# Patient Record
Sex: FEMALE | Race: WHITE | NOT HISPANIC OR LATINO | Employment: FULL TIME | URBAN - METROPOLITAN AREA
[De-identification: names, ages, dates, MRNs, and addresses within clinical notes are randomized per-mention and may not be internally consistent; named-entity substitution may affect disease eponyms.]

---

## 2020-02-05 LAB — CRC RECOMMENDATION EXT: NORMAL

## 2021-11-04 RX ORDER — HYDROCHLOROTHIAZIDE 25 MG/1
25 TABLET ORAL
COMMUNITY
Start: 2021-01-05 | End: 2021-11-05

## 2021-11-04 RX ORDER — ESCITALOPRAM OXALATE 10 MG/1
TABLET ORAL
COMMUNITY
Start: 2020-11-02 | End: 2021-11-05 | Stop reason: SDUPTHER

## 2021-11-04 RX ORDER — METFORMIN HYDROCHLORIDE 500 MG/1
TABLET, EXTENDED RELEASE ORAL
COMMUNITY
Start: 2021-01-05 | End: 2023-05-29

## 2023-02-27 RX ORDER — SEMAGLUTIDE 1.34 MG/ML
INJECTION, SOLUTION SUBCUTANEOUS
COMMUNITY
Start: 2022-12-14 | End: 2023-02-27 | Stop reason: SDUPTHER

## 2023-02-27 RX ORDER — SEMAGLUTIDE 1.34 MG/ML
0.5 INJECTION, SOLUTION SUBCUTANEOUS
Qty: 1 PEN | Refills: 5 | Status: SHIPPED | OUTPATIENT
Start: 2023-02-27 | End: 2023-02-28

## 2023-02-27 NOTE — TELEPHONE ENCOUNTER
----- Message from Layton Alonzo sent at 2/27/2023  2:13 PM CST -----  Regarding: Refill  Pt needs Ozempic refills. Please call pt.

## 2023-02-28 RX ORDER — SEMAGLUTIDE 1.34 MG/ML
0.5 INJECTION, SOLUTION SUBCUTANEOUS
Qty: 1 PEN | Refills: 5 | Status: SHIPPED | OUTPATIENT
Start: 2023-02-28 | End: 2023-04-04 | Stop reason: SDUPTHER

## 2023-02-28 NOTE — TELEPHONE ENCOUNTER
----- Message from Alix Krueger sent at 2/28/2023  4:52 PM CST -----  Contact: Elena  Patient stated that medication was called to the wrong pharmacy Please send to the below alike company, stated she has [employer], [facility]       .OZEMPIC 0.25 mg or 0.5 mg(2 mg/1.5 mL) pen injector 1 pen 5 2/27/2023   Sig: Inject 0.5 mg into the skin every 7 days.  Route: Subcutaneous        Please call her at [phone]      [address] V5X 2S4  [phone]  [facility]

## 2023-04-04 NOTE — TELEPHONE ENCOUNTER
----- Message from Kaitlin Jaimes sent at 4/4/2023  1:52 PM CDT -----  .Type:  RX Refill Request    Who Called: PT     Refill or New Rx: REFILL     RX Name and Strength: OZEMPIC 0.25 mg or 0.5 mg(2 mg/1.5 mL) pen injector    Preferred Pharmacy with phone number: [facility] [employer])  [address] [phone]    Pt Call Back Number: [phone]    Additional Information: PT WAS SEEING THE PROVIDER BEFORE SHE CAME TO [facility] SHE WAS TOLD THE PROVIDER WOULD FILL IT. IT WAS SENT TO THE WRONG PHARMACY      Thank You

## 2023-04-05 RX ORDER — SEMAGLUTIDE 1.34 MG/ML
0.5 INJECTION, SOLUTION SUBCUTANEOUS
Qty: 1 EACH | Refills: 5 | Status: SHIPPED | OUTPATIENT
Start: 2023-04-05 | End: 2023-05-29 | Stop reason: CLARIF

## 2023-05-18 RX ORDER — SEMAGLUTIDE 1.34 MG/ML
0.5 INJECTION, SOLUTION SUBCUTANEOUS
Qty: 1 EACH | Refills: 5 | OUTPATIENT
Start: 2023-05-18

## 2023-05-18 NOTE — TELEPHONE ENCOUNTER
----- Message from Andrea Jaimes sent at 5/18/2023  1:06 PM CDT -----  Type:  Patient Returning Call    Who Called:pt   Who Left Message for Patient:  Does the patient know what this is regarding?:pt advice  Would the patient rather a call back or a response via [facility]? call  Best Call Back Number:[phone]  Additional Information: pt has questions about her ozempic prescription

## 2023-05-19 ENCOUNTER — TELEPHONE (OUTPATIENT)
Dept: PRIMARY CARE CLINIC | Facility: CLINIC | Age: 53
End: 2023-05-19
Payer: COMMERCIAL

## 2023-05-19 NOTE — TELEPHONE ENCOUNTER
----- Message from Giovana Calle sent at 5/18/2023  5:28 PM CDT -----  Contact: pt  Type:  Patient Returning Call    Who Called:pt  Who Left Message for Patient:nurse  Does the patient know what this is regarding?:  Would the patient rather a call back or a response via [employer]? Call   Best Call Back Number:[phone]  Additional Information:

## 2023-05-19 NOTE — TELEPHONE ENCOUNTER
Patient wants to know if she can get the ozempic compounded from a pharmacy in [place]. Rosalba Carlson told her about it.       Please advise.

## 2023-05-23 DIAGNOSIS — E16.1 HYPERINSULINISM: Primary | ICD-10-CM

## 2023-05-24 LAB
ALBUMIN SERPL-MCNC: 4.6 G/DL (ref 3.6–5.1)
ALBUMIN/GLOB SERPL: 2.1 (CALC) (ref 1–2.5)
ALP SERPL-CCNC: 31 U/L (ref 37–153)
ALT SERPL-CCNC: 10 U/L (ref 6–29)
AST SERPL-CCNC: 14 U/L (ref 10–35)
BASOPHILS # BLD AUTO: 32 CELLS/UL (ref 0–200)
BASOPHILS NFR BLD AUTO: 0.9 %
BILIRUB SERPL-MCNC: 0.9 MG/DL (ref 0.2–1.2)
BUN SERPL-MCNC: 15 MG/DL (ref 7–25)
BUN/CREAT SERPL: ABNORMAL (CALC) (ref 6–22)
CALCIUM SERPL-MCNC: 9.6 MG/DL (ref 8.6–10.4)
CHLORIDE SERPL-SCNC: 103 MMOL/L (ref 98–110)
CHOLEST SERPL-MCNC: 227 MG/DL
CHOLEST/HDLC SERPL: 2.9 (CALC)
CO2 SERPL-SCNC: 29 MMOL/L (ref 20–32)
CREAT SERPL-MCNC: 0.78 MG/DL (ref 0.5–1.03)
EGFR: 92 ML/MIN/1.73M2
EOSINOPHIL # BLD AUTO: 21 CELLS/UL (ref 15–500)
EOSINOPHIL NFR BLD AUTO: 0.6 %
ERYTHROCYTE [DISTWIDTH] IN BLOOD BY AUTOMATED COUNT: 13.3 % (ref 11–15)
GLOBULIN SER CALC-MCNC: 2.2 G/DL (CALC) (ref 1.9–3.7)
GLUCOSE SERPL-MCNC: 96 MG/DL (ref 65–99)
HBA1C MFR BLD: 4.8 % OF TOTAL HGB
HCT VFR BLD AUTO: 38.1 % (ref 35–45)
HDLC SERPL-MCNC: 78 MG/DL
HGB BLD-MCNC: 12.2 G/DL (ref 11.7–15.5)
INSULIN SERPL-ACNC: 3.7 UIU/ML
LDLC SERPL CALC-MCNC: 133 MG/DL (CALC)
LYMPHOCYTES # BLD AUTO: 1201 CELLS/UL (ref 850–3900)
LYMPHOCYTES NFR BLD AUTO: 34.3 %
MCH RBC QN AUTO: 31.1 PG (ref 27–33)
MCHC RBC AUTO-ENTMCNC: 32 G/DL (ref 32–36)
MCV RBC AUTO: 97.2 FL (ref 80–100)
MONOCYTES # BLD AUTO: 221 CELLS/UL (ref 200–950)
MONOCYTES NFR BLD AUTO: 6.3 %
NEUTROPHILS # BLD AUTO: 2027 CELLS/UL (ref 1500–7800)
NEUTROPHILS NFR BLD AUTO: 57.9 %
NONHDLC SERPL-MCNC: 149 MG/DL (CALC)
PLATELET # BLD AUTO: 192 THOUSAND/UL (ref 140–400)
PMV BLD REES-ECKER: 10.5 FL (ref 7.5–12.5)
POTASSIUM SERPL-SCNC: 4.5 MMOL/L (ref 3.5–5.3)
PROT SERPL-MCNC: 6.8 G/DL (ref 6.1–8.1)
RBC # BLD AUTO: 3.92 MILLION/UL (ref 3.8–5.1)
SODIUM SERPL-SCNC: 138 MMOL/L (ref 135–146)
TRIGL SERPL-MCNC: 72 MG/DL
WBC # BLD AUTO: 3.5 THOUSAND/UL (ref 3.8–10.8)

## 2023-05-29 ENCOUNTER — OFFICE VISIT (OUTPATIENT)
Dept: PRIMARY CARE CLINIC | Facility: CLINIC | Age: 53
End: 2023-05-29
Payer: COMMERCIAL

## 2023-05-29 VITALS
DIASTOLIC BLOOD PRESSURE: 62 MMHG | WEIGHT: 130.75 LBS | BODY MASS INDEX: 22.32 KG/M2 | RESPIRATION RATE: 16 BRPM | TEMPERATURE: 98 F | HEIGHT: 64 IN | SYSTOLIC BLOOD PRESSURE: 126 MMHG | HEART RATE: 70 BPM | OXYGEN SATURATION: 98 %

## 2023-05-29 DIAGNOSIS — R63.8 DIFFICULTY MAINTAINING WEIGHT: ICD-10-CM

## 2023-05-29 DIAGNOSIS — E16.1 HYPERINSULINISM: Primary | ICD-10-CM

## 2023-05-29 DIAGNOSIS — Z63.4: ICD-10-CM

## 2023-05-29 DIAGNOSIS — E78.5 HYPERLIPIDEMIA, UNSPECIFIED HYPERLIPIDEMIA TYPE: ICD-10-CM

## 2023-05-29 DIAGNOSIS — E28.0 HYPERESTROGENISM: ICD-10-CM

## 2023-05-29 PROBLEM — Z86.59 HISTORY OF EATING DISORDER: Status: ACTIVE | Noted: 2023-02-28

## 2023-05-29 PROBLEM — F41.1 GENERALIZED ANXIETY DISORDER: Status: ACTIVE | Noted: 2023-02-28

## 2023-05-29 PROBLEM — E88.819 INSULIN RESISTANCE: Status: ACTIVE | Noted: 2020-06-24

## 2023-05-29 PROCEDURE — 99999 PR PBB SHADOW E&M-EST. PATIENT-LVL III: CPT | Mod: PBBFAC,,, | Performed by: FAMILY MEDICINE

## 2023-05-29 PROCEDURE — 99215 OFFICE O/P EST HI 40 MIN: CPT | Mod: S$GLB,,, | Performed by: FAMILY MEDICINE

## 2023-05-29 PROCEDURE — 99215 PR OFFICE/OUTPT VISIT, EST, LEVL V, 40-54 MIN: ICD-10-PCS | Mod: S$GLB,,, | Performed by: FAMILY MEDICINE

## 2023-05-29 PROCEDURE — 99999 PR PBB SHADOW E&M-EST. PATIENT-LVL III: ICD-10-PCS | Mod: PBBFAC,,, | Performed by: FAMILY MEDICINE

## 2023-05-29 RX ORDER — BUPROPION HYDROCHLORIDE 150 MG/1
150 TABLET ORAL DAILY
Qty: 30 TABLET | Refills: 5 | Status: SHIPPED | OUTPATIENT
Start: 2023-05-29 | End: 2023-12-04 | Stop reason: SDUPTHER

## 2023-05-29 SDOH — SOCIAL DETERMINANTS OF HEALTH (SDOH): DISSAPEARANCE AND DEATH OF FAMILY MEMBER: Z63.4

## 2023-05-29 NOTE — PROGRESS NOTES
"    [facility]       [address] [place] [address]      [phone]     Tania Alex MD         .      Office Visit  05/29/2023  [id number]      SUBJECTIVE     HPI:  Elena Mayer is a 53 y.o. female presents today in clinic for "Follow-up  ."   Patient is here from [facility]- patient is doing fairly well, is concerned about her current Ozempic.  She was getting it from [facility] and has been doing very well on this.  She is looking for an alternative pharmacy since she is unable to get it from this pharmacy any longer.  She was able to stay at her goal weight of 125.  Six months ago, she did lose her son through overdose and this has caused a great deal of stress and grieving which probably contributed to some of her weight gain.  She did see a psychologist, Dr. Monahan, and was started on Lexapro.  She does not know if it is really doing anything, but she does take it.  She has a great family support system and they are all going to therapy to help him through this tough time.  Patient is very concerned that weight gain without Ozempic will further worsen her depression as she does not even want to think about what will happen next.  She tries to exercise as she used to be an avid runner, but sometimes she does not even have motivation to want to exercise but she tries to stay moving as much as possible to not only help with her overall health, but also her mood    Follow-up      ROS   Review of symptoms are negative except as noted.     PAST MEDICAL HISTORY     Past Medical History:   Diagnosis Date    Abnormal glandular Papanicolaou smear of cervix 7/24/2017 8:53:52 AM    [facility]: Abnormal PAP Smear-No Additional Notes    Pyelonephritis 7/24/2017 8:53:48 AM    [facility] - Urology: Pyelonephritis-No Additional Notes       Past Surgical History:   Procedure Laterality Date    AUGMENTATION OF BREAST Bilateral  " "   2014    BREAST SURGERY  2015    ENDOMETRIAL ABLATION      nuvasure    FOOT SURGERY      HYSTEROSCOPY WITH DILATION AND CURETTAGE OF UTERUS      LASER ABLATION OF THE CERVIX         Social History     Socioeconomic History    Marital status:    Tobacco Use    Smoking status: Never    Smokeless tobacco: Never   Substance and Sexual Activity    Alcohol use: Yes    Drug use: Never    Sexual activity: Yes     Partners: Male     Birth control/protection: Partner-Vasectomy       Allergies as of 05/29/2023    (No Known Allergies)       HOME MEDS     Current Outpatient Medications   Medication Instructions    buPROPion (WELLBUTRIN XL) 150 mg, Oral, Daily    EScitalopram oxalate (LEXAPRO) 10 mg, Oral, Daily    INV semaglutide/placebo injection Semaglutide 5mg / Cyanocobalamin 0.5mg / 1 mL - Inject 50 units (0.5 mL) SQ every 7 days - Disp: 2.5 mL vial - Please disp with U-50 or U-100 syringes and 5/32" 32g needles.       The following were updated and reviewed by myself in the chart: medications, past medical history, past surgical history, family history, social history, and allergies.        Objective    OBJECTIVE   /62   Pulse 70   Temp 98.2 °F (36.8 °C)   Resp 16   Ht 5' 4" (1.626 m)   Wt 59.3 kg (130 lb 11.7 oz)   SpO2 98%   BMI 22.44 kg/m²     Wt Readings from Last 2 Encounters:   05/29/23 59.3 kg (130 lb 11.7 oz)   11/08/22 57.6 kg (127 lb)       BP Readings from Last 2 Encounters:   05/29/23 126/62   11/08/22 120/68          Physical Exam  Vitals and nursing note reviewed.   Constitutional:       Appearance: Normal appearance. She is normal weight.   HENT:      Head: Normocephalic and atraumatic.      Nose: Nose normal.   Eyes:      Extraocular Movements: Extraocular movements intact.      Conjunctiva/sclera: Conjunctivae normal.      Pupils: Pupils are equal, round, and reactive to light.   Cardiovascular:      Rate and Rhythm: Normal rate and regular rhythm.   Pulmonary:      Effort: Pulmonary " "effort is normal.      Breath sounds: Normal breath sounds.   Abdominal:      General: Abdomen is flat.      Palpations: Abdomen is soft.      Tenderness: There is no abdominal tenderness.   Musculoskeletal:         General: No swelling or tenderness. Normal range of motion.      Cervical back: Normal range of motion.   Lymphadenopathy:      Cervical: No cervical adenopathy.   Skin:     General: Skin is warm.      Findings: No lesion or rash.      Comments: Fair skin   Neurological:      General: No focal deficit present.      Mental Status: She is alert. Mental status is at baseline.   Psychiatric:         Mood and Affect: Mood normal.         Behavior: Behavior normal.             LABS      LAB RESULTS, IF AVAILABLE, ARE DISCUSSED WITH PATIENT AND POSTED TO PATIENT PORTAL     ASSESSMENT & PLAN     1. Hyperinsulinism  Pathophysiology of insulin resistance discussed with patient and therapeutic options were explained.  Dietary and lifestyle changes are recommended for the treatment of insulin resistance.   Low carbohydrate diet and/or possible intermittent fasting is recommended for insulin resistance and/or prediabetes.  Non FDA approved treatment options include but is not limited to GLP's, SGLT-2's, biguanides, and other glucose support supplements. Patient will notify us with any concerns or complaints regarding treatment plan.Weight loss is strongly encouraged and is emphasized to help reduce risk of diabetes.  We will try sending to an alternative pharmacy to see if she can continue this regimen as it is done great for glucose debility and for maintaining her weight loss  -     INV semaglutide/placebo injection; Semaglutide 5mg / Cyanocobalamin 0.5mg / 1 mL - Inject 50 units (0.5 mL) SQ every 7 days - Disp: 2.5 mL vial - Please disp with U-50 or U-100 syringes and 5/32" 32g needles.  Dispense: 2.5 mL; Refill: 3    2. Hyperestrogenism      3. Hyperlipidemia, unspecified hyperlipidemia type  Reviewed importance " of advanced lipid profiles. Advise daily exercise. Diet is recommended. Patients are encouraged to obtain healthy BMI weight. Risks associated with high cholesterol are well established and include but are not limited to heart disease, stroke and peripheral vascular disease. Patient should not smoke. Goal LDL particle number is <1000 and ApoB <70. Basic LDL is below 100 or below 70 if diabetic. Some non-FDA approved dietary supplements that may be beneficial to patient include but is not limited to high fiber diet at least 30g daily; niacin ER non flush free variety 500mg-1,000mg; Omega-3 fish oil DHA+EPA = 1,000mg twice daily; baby dose aspirin 81mg; co-enzyme q10 500mg to help reduce statin-induced myalgia; red rice yeast 1200mg twice daily; berberine 1000mg-2000mg daily. Patient is encouraged to exercise routinely and adhere to heart healthy diet with Mediterranean diet showing the most consistent data to help with lipid management.  Despite weight loss, patient is having increased LDL.  Not wanting to look at medications at this time, we will continue to monitor and consider looking at advanced lipids next time.    4. Difficulty maintaining weight  Question age versus menopause versus Lexapro start.     5. Bereavement in remission  Discussed different aspects of depression. Patient denies suicidal or homicidal ideation. Patient should also consider counseling services and community resources. Advised patient on how medications work and how to take medication(s). Patient was advised to call or come in if symptoms develop. Patient verbalized understanding.Patient understands to seek immediate care if suicidal thoughts occur. Patient will follow up as directed.  Patient is doing fairly well considering the loss she has dealt with unexpectedly.  Discuss possible benefits of adding Wellbutrin, with a without Lexapro.  -     buPROPion (WELLBUTRIN XL) 150 MG TB24 tablet; Take 1 tablet (150 mg total) by mouth once  "daily.  Dispense: 30 tablet; Refill: 5           I spent a total of 41 minutes face to face and non-face to face on the date of this visit.This includes time preparing to see the patient (eg, review of tests, notes), obtaining and/or reviewing additional history from an independent historian and/or outside medical records, documenting clinical information in the electronic health record, independently interpreting results and/or communicating results to the patient/family/caregiver, or care coordinator.      Disclaimer: Portions of this record may have been created with voice recognition software. Occasional wrong-word or "sound-a-like" substitutions may have occurred due to inherent limitations of voice recognition software. Read the chart carefully and recognize, using context, where substitutions have occurred."    Signed by:  Tania Alex MD    @[facility]@       "

## 2023-06-01 ENCOUNTER — PATIENT MESSAGE (OUTPATIENT)
Dept: PRIMARY CARE CLINIC | Facility: CLINIC | Age: 53
End: 2023-06-01
Payer: COMMERCIAL

## 2023-06-02 ENCOUNTER — PATIENT MESSAGE (OUTPATIENT)
Dept: PRIMARY CARE CLINIC | Facility: CLINIC | Age: 53
End: 2023-06-02
Payer: COMMERCIAL

## 2023-06-02 ENCOUNTER — PATIENT MESSAGE (OUTPATIENT)
Dept: ADMINISTRATIVE | Facility: HOSPITAL | Age: 53
End: 2023-06-02
Payer: COMMERCIAL

## 2023-06-09 ENCOUNTER — TELEPHONE (OUTPATIENT)
Dept: PRIMARY CARE CLINIC | Facility: CLINIC | Age: 53
End: 2023-06-09
Payer: COMMERCIAL

## 2023-06-09 NOTE — TELEPHONE ENCOUNTER
----- Message from Sylwia Philippe sent at 6/9/2023 12:55 PM CDT -----  Contact: Mechelle/[facility]  Type:  Pharmacy Calling to Clarify an RX    Name of Caller: Mechelle  Pharmacy Name: [facility]  Prescription Name: INV semaglutide/placebo injection  What do they need to clarify?: duplicate prescription, please advise  Best Call Back Number: [phone]  Additional Information: REF# [id number]

## 2023-08-31 ENCOUNTER — PATIENT MESSAGE (OUTPATIENT)
Dept: PRIMARY CARE CLINIC | Facility: CLINIC | Age: 53
End: 2023-08-31
Payer: COMMERCIAL

## 2023-08-31 RX ORDER — SEMAGLUTIDE 1.34 MG/ML
1 INJECTION, SOLUTION SUBCUTANEOUS
Qty: 3 ML | Refills: 11 | Status: SHIPPED | OUTPATIENT
Start: 2023-08-31 | End: 2023-12-04

## 2023-12-04 ENCOUNTER — OFFICE VISIT (OUTPATIENT)
Dept: PRIMARY CARE CLINIC | Facility: CLINIC | Age: 53
End: 2023-12-04
Payer: COMMERCIAL

## 2023-12-04 VITALS
HEART RATE: 77 BPM | DIASTOLIC BLOOD PRESSURE: 82 MMHG | SYSTOLIC BLOOD PRESSURE: 120 MMHG | HEIGHT: 64 IN | OXYGEN SATURATION: 99 % | BODY MASS INDEX: 22.17 KG/M2 | WEIGHT: 129.88 LBS | TEMPERATURE: 99 F

## 2023-12-04 DIAGNOSIS — E16.1 HYPERINSULINISM: Primary | ICD-10-CM

## 2023-12-04 DIAGNOSIS — Z63.4: ICD-10-CM

## 2023-12-04 PROCEDURE — 99214 PR OFFICE/OUTPT VISIT, EST, LEVL IV, 30-39 MIN: ICD-10-PCS | Mod: S$GLB,,, | Performed by: FAMILY MEDICINE

## 2023-12-04 PROCEDURE — 99999 PR PBB SHADOW E&M-EST. PATIENT-LVL III: ICD-10-PCS | Mod: PBBFAC,,, | Performed by: FAMILY MEDICINE

## 2023-12-04 PROCEDURE — 99999 PR PBB SHADOW E&M-EST. PATIENT-LVL III: CPT | Mod: PBBFAC,,, | Performed by: FAMILY MEDICINE

## 2023-12-04 PROCEDURE — 99214 OFFICE O/P EST MOD 30 MIN: CPT | Mod: S$GLB,,, | Performed by: FAMILY MEDICINE

## 2023-12-04 RX ORDER — BUPROPION HYDROCHLORIDE 150 MG/1
300 TABLET ORAL DAILY
Qty: 60 TABLET | Refills: 5 | Status: SHIPPED | OUTPATIENT
Start: 2023-12-04 | End: 2024-01-09

## 2023-12-04 SDOH — SOCIAL DETERMINANTS OF HEALTH (SDOH): DISSAPEARANCE AND DEATH OF FAMILY MEMBER: Z63.4

## 2023-12-04 NOTE — PROGRESS NOTES
"    [facility]       [address] [place] [address]      [phone]     Tania Alex MD         .      Office Visit  12/04/2023  [id number]      SUBJECTIVE     HPI:  Elena Mayer is a 53 y.o. female presents today in clinic for "Follow-up  ."   Patient is here for routine follow-up.  She is been on the compounded semaglutide for nearly six months and has done well.  She feels like it is great in helping stabilize her blood sugars and her weight is stable.  She is still taking Wellbutrin, but is still complicated bereavement with the loss of her son do overdose last year.  It is coming up on the anniversary, and this is the holidays, so this does tend to be a trying time for her.  She is got great family support.  Her other two children seem to be adapting well.  She did previously try Lexapro but did not like the way she felt.  She did see your OB and had hormone levels looked at but it appears she is not in menopausal but she did have an ablation.  At this point she would not really want to change anything she thinks everything is going well, but is open to increasing her Wellbutrin if that would help with just the holiday seasonal depression.        ROS   Review of symptoms are negative except as noted.     PAST MEDICAL HISTORY     Past Medical History:   Diagnosis Date    Abnormal glandular Papanicolaou smear of cervix 7/24/2017 8:53:52 AM    [facility]: Abnormal PAP Smear-No Additional Notes    Pyelonephritis 7/24/2017 8:53:48 AM    [facility] - Urology: Pyelonephritis-No Additional Notes       Past Surgical History:   Procedure Laterality Date    AUGMENTATION OF BREAST Bilateral     2014    BREAST SURGERY  2015    ENDOMETRIAL ABLATION      nuvasure    FOOT SURGERY      HYSTEROSCOPY WITH DILATION AND CURETTAGE OF UTERUS      LASER ABLATION OF THE CERVIX         Social History     Socioeconomic History    " "Marital status:    Tobacco Use    Smoking status: Never     Passive exposure: Never    Smokeless tobacco: Never   Substance and Sexual Activity    Alcohol use: Yes    Drug use: Never    Sexual activity: Yes     Partners: Male     Birth control/protection: Partner-Vasectomy       Allergies as of 12/04/2023    (No Known Allergies)       HOME MEDS     Current Outpatient Medications   Medication Instructions    buPROPion (WELLBUTRIN XL) 300 mg, Oral, Daily    INV semaglutide/placebo injection Semaglutide 5mg / Cyanocobalamin 0.5mg / 1 mL - Inject 50 units (0.5 mL) SQ every 7 days - Disp: 2.5 mL vial - Please disp with U-50 or U-100 syringes and 5/32" 32g needles.    INV semaglutide/placebo injection Semaglutide 5mg / Cyanocobalamin 0.5mg / 1 mL - Inject 40 units (0.4 mL) SQ every 7 days - Disp: 2.5 mL vial - Please disp with U-50 or U-100 syringes and 5/32" 32g needles.       The following were updated and reviewed by myself in the chart: medications, past medical history, past surgical history, family history, social history, and allergies.        Objective    OBJECTIVE   /82   Pulse 77   Temp 98.9 °F (37.2 °C)   Ht 5' 4" (1.626 m)   Wt 58.9 kg (129 lb 13.6 oz)   SpO2 99%   BMI 22.29 kg/m²     Wt Readings from Last 2 Encounters:   12/04/23 58.9 kg (129 lb 13.6 oz)   11/10/23 57.8 kg (127 lb 6.4 oz)       BP Readings from Last 2 Encounters:   12/04/23 120/82   11/10/23 112/84          Physical Exam  Vitals and nursing note reviewed.   Constitutional:       Appearance: Normal appearance. She is normal weight.   HENT:      Head: Normocephalic and atraumatic.      Nose: Nose normal.   Eyes:      Extraocular Movements: Extraocular movements intact.      Conjunctiva/sclera: Conjunctivae normal.      Pupils: Pupils are equal, round, and reactive to light.   Cardiovascular:      Rate and Rhythm: Normal rate and regular rhythm.   Pulmonary:      Effort: Pulmonary effort is normal.      Breath sounds: Normal " "breath sounds.   Abdominal:      General: Abdomen is flat.      Palpations: Abdomen is soft.      Tenderness: There is no abdominal tenderness.   Musculoskeletal:         General: No swelling or tenderness. Normal range of motion.      Cervical back: Normal range of motion.   Lymphadenopathy:      Cervical: No cervical adenopathy.   Skin:     General: Skin is warm.      Findings: No lesion or rash.      Comments: Fair skin   Neurological:      General: No focal deficit present.      Mental Status: She is alert. Mental status is at baseline.   Psychiatric:         Mood and Affect: Mood normal.         Behavior: Behavior normal.               LABS      LAB RESULTS, IF AVAILABLE, ARE DISCUSSED WITH PATIENT AND POSTED TO PATIENT PORTAL     ASSESSMENT & PLAN     1. Hyperinsulinism  -     INV semaglutide/placebo injection; Semaglutide 5mg / Cyanocobalamin 0.5mg / 1 mL - Inject 40 units (0.4 mL) SQ every 7 days - Disp: 2.5 mL vial - Please disp with U-50 or U-100 syringes and 5/32" 32g needles.  Dispense: 2.5 mL; Refill: 3    2. Bereavement in remission  -     buPROPion (WELLBUTRIN XL) 150 MG TB24 tablet; Take 2 tablets (300 mg total) by mouth once daily.  Dispense: 60 tablet; Refill: 5      Patient is doing well on the compounded semaglutide and does not want to give it this up.  Okay to start taking it less often to see if she can maintain but she has played with taking different doses and she does see a difference when she is not taking 1 mg.  She does have more fatigue, so I did advise her to take vitamin-D 2000 given her skin tone, and okay to add B vitamins to see if this can help with energy.  Regarding Wellbutrin, she has done well on this much better than the Lexapro, but given the holidays in sadness that comes with this, okay to try going up to two tablets to see if this can help with more energy and help more with her grieving.  Okay to plan to get labs next year or sooner if she has any problems.    I spent a " "total of 30 minutes face to face and non-face to face on the date of this visit.This includes time preparing to see the patient (eg, review of tests, notes), obtaining and/or reviewing additional history from an independent historian and/or outside medical records, documenting clinical information in the electronic health record, independently interpreting results and/or communicating results to the patient/family/caregiver, or care coordinator.      Disclaimer: Portions of this record may have been created with voice recognition software. Occasional wrong-word or "sound-a-like" substitutions may have occurred due to inherent limitations of voice recognition software. Read the chart carefully and recognize, using context, where substitutions have occurred."    Signed by:  Tania Alex MD           " Detail Level: Zone Initiate Treatment: Cephalexin 1000mg daily (discussed that Cephalexin is a safe medication to be on if she becomes pregnant) Continue Regimen: Clindamycin-BP daily

## 2023-12-08 ENCOUNTER — PATIENT OUTREACH (OUTPATIENT)
Dept: ADMINISTRATIVE | Facility: HOSPITAL | Age: 53
End: 2023-12-08
Payer: COMMERCIAL

## 2023-12-08 NOTE — PROGRESS NOTES
Uploaded AMA COLONOSCOPY 12/4/23 ; faxed to Dr. Lizzette Parks 1x to request. Reminder set 1 week.

## 2023-12-15 NOTE — PROGRESS NOTES
2nd attempt  AMA COLONOSCOPY 12/4/23 ; faxed to Dr. Lizzette Parks 1x to request. Reminder set 1 week.

## 2024-01-09 DIAGNOSIS — Z63.4: ICD-10-CM

## 2024-01-09 RX ORDER — BUPROPION HYDROCHLORIDE 150 MG/1
150 TABLET ORAL
Qty: 30 TABLET | Refills: 5 | Status: SHIPPED | OUTPATIENT
Start: 2024-01-09

## 2024-01-09 SDOH — SOCIAL DETERMINANTS OF HEALTH (SDOH): DISSAPEARANCE AND DEATH OF FAMILY MEMBER: Z63.4

## 2024-04-24 ENCOUNTER — PATIENT MESSAGE (OUTPATIENT)
Dept: FAMILY MEDICINE | Facility: CLINIC | Age: 54
End: 2024-04-24
Payer: COMMERCIAL